# Patient Record
Sex: MALE | Race: BLACK OR AFRICAN AMERICAN | URBAN - METROPOLITAN AREA
[De-identification: names, ages, dates, MRNs, and addresses within clinical notes are randomized per-mention and may not be internally consistent; named-entity substitution may affect disease eponyms.]

---

## 2017-05-11 ENCOUNTER — HISTORICAL (OUTPATIENT)
Dept: ENDOSCOPY | Facility: HOSPITAL | Age: 57
End: 2017-05-11

## 2020-01-09 ENCOUNTER — HISTORICAL (OUTPATIENT)
Dept: ADMINISTRATIVE | Facility: HOSPITAL | Age: 60
End: 2020-01-09

## 2020-01-09 LAB
ABS NEUT (OLG): 8.97 X10(3)/MCL (ref 2.1–9.2)
ALBUMIN SERPL-MCNC: 3.8 GM/DL (ref 3.4–5)
ALBUMIN/GLOB SERPL: 1.1 RATIO (ref 1.1–2)
ALP SERPL-CCNC: 55 UNIT/L (ref 45–117)
ALT SERPL-CCNC: 22 UNIT/L (ref 12–78)
AST SERPL-CCNC: 20 UNIT/L (ref 15–37)
BASOPHILS # BLD AUTO: 0 X10(3)/MCL (ref 0–0.2)
BASOPHILS NFR BLD AUTO: 0 %
BILIRUB SERPL-MCNC: 0.3 MG/DL (ref 0.2–1)
BILIRUBIN DIRECT+TOT PNL SERPL-MCNC: 0.1 MG/DL (ref 0–0.2)
BILIRUBIN DIRECT+TOT PNL SERPL-MCNC: 0.2 MG/DL
BUN SERPL-MCNC: 21 MG/DL (ref 7–18)
CALCIUM SERPL-MCNC: 9.2 MG/DL (ref 8.5–10.1)
CHLORIDE SERPL-SCNC: 110 MMOL/L (ref 98–107)
CHOLEST SERPL-MCNC: 133 MG/DL
CHOLEST/HDLC SERPL: 4.2 {RATIO} (ref 0–5)
CO2 SERPL-SCNC: 29 MMOL/L (ref 21–32)
CREAT SERPL-MCNC: 1 MG/DL (ref 0.6–1.3)
EOSINOPHIL # BLD AUTO: 0.3 X10(3)/MCL (ref 0–0.9)
EOSINOPHIL NFR BLD AUTO: 2 %
ERYTHROCYTE [DISTWIDTH] IN BLOOD BY AUTOMATED COUNT: 11.2 % (ref 11.5–14.5)
GLOBULIN SER-MCNC: 3.5 GM/ML (ref 2.3–3.5)
GLUCOSE SERPL-MCNC: 115 MG/DL (ref 74–106)
HCT VFR BLD AUTO: 44 % (ref 40–51)
HDLC SERPL-MCNC: 32 MG/DL (ref 40–59)
HGB BLD-MCNC: 13.7 GM/DL (ref 13.5–17.5)
IMM GRANULOCYTES # BLD AUTO: 0.04 10*3/UL
IMM GRANULOCYTES NFR BLD AUTO: 0 %
LDLC SERPL CALC-MCNC: 84 MG/DL
LYMPHOCYTES # BLD AUTO: 1.9 X10(3)/MCL (ref 0.6–4.6)
LYMPHOCYTES NFR BLD AUTO: 16 %
MCH RBC QN AUTO: 30 PG (ref 26–34)
MCHC RBC AUTO-ENTMCNC: 31.1 GM/DL (ref 31–37)
MCV RBC AUTO: 96.5 FL (ref 80–100)
MONOCYTES # BLD AUTO: 0.9 X10(3)/MCL (ref 0.1–1.3)
MONOCYTES NFR BLD AUTO: 8 %
NEUTROPHILS # BLD AUTO: 8.97 X10(3)/MCL (ref 2.1–9.2)
NEUTROPHILS NFR BLD AUTO: 74 %
PLATELET # BLD AUTO: 241 X10(3)/MCL (ref 130–400)
PMV BLD AUTO: 11 FL (ref 7.4–10.4)
POTASSIUM SERPL-SCNC: 4 MMOL/L (ref 3.5–5.1)
PROT SERPL-MCNC: 7.3 GM/DL (ref 6.4–8.2)
RBC # BLD AUTO: 4.56 X10(6)/MCL (ref 4.5–5.9)
SODIUM SERPL-SCNC: 145 MMOL/L (ref 136–145)
TRIGL SERPL-MCNC: 86 MG/DL
VLDLC SERPL CALC-MCNC: 17 MG/DL
WBC # SPEC AUTO: 12.1 X10(3)/MCL (ref 4.5–11)

## 2022-04-30 NOTE — OP NOTE
Patient:   Ronald Zapata            MRN: [id number]            FIN: [id number]               Age:   57 years     Sex:  Male     :  1960   Associated Diagnoses:   None   Author:   Colin Kent MD        DATE OF SURGERY:   May 11, 2017    SURGEON:  Colin Kent MD    ATTENDING PHYSICIAN:  Colin Kent MD    PROCEDURE PERFORMED:  Colonoscopy    PREOPERATIVE DIAGNOSIS:  Screening    POSTOPERATIVE DIAGNOSIS:  Colon polyps    INDICATIONS:  The patient is a 57-year-old male who is here for his initial colonoscopy.  He has no GI symptoms.  No known family history of GI malignancy.    PROCEDURE IN DETAIL:  After explaining the procedure in detail and discussing risks and benefits, written informed consents were obtained.  He was sedated by anesthesia and laid in the left lateral decubitus position.  A digital rectal exam was within normal limits.  The colonoscope was introduced into the anal canal and advanced around the splenic and hepatic flexures and into the base of the cecum.  The appendiceal orifice was visualized.  The ileocecal valve was not intubated.  The prep was fairly poor but adequate with lavage.  The scope was slowly withdrawn to the rectum examining the colonic mucosa meticulously.  Diminutive polyps were removed with hot forceps at 70 and 60 cm.  At 25 cm there was a 1 cm polyp.  This was removed with a hot snare and the specimen retrieved.  The scope was then slowly withdrawn out of the remainder of the colon and the procedure was terminated.  He tolerated this well.  There were no complications.    ESTIMATED BLOOD LOSS:   None  FINDINGS:    70 cmdiminutive polyp.  Removed with hot forceps  60 cmdiminutive polyp.  Removed with hot forceps  25 cm1 cm polyp.  Removed with hot snare.  Otherwise the prep was fairly poor but the remainder of the colonic mucosa was free of large lesions.    IMPRESSION: Colon polyps    PLAN:   Follow-up with PCP in 2-3 weeks.  Path will need follow-up.   Secondary to prep, consider repeat colonoscopy in 1 year.